# Patient Record
Sex: MALE | Race: WHITE | NOT HISPANIC OR LATINO | Employment: FULL TIME | URBAN - METROPOLITAN AREA
[De-identification: names, ages, dates, MRNs, and addresses within clinical notes are randomized per-mention and may not be internally consistent; named-entity substitution may affect disease eponyms.]

---

## 2021-08-28 ENCOUNTER — HOSPITAL ENCOUNTER (EMERGENCY)
Facility: MEDICAL CENTER | Age: 46
End: 2021-08-28
Attending: EMERGENCY MEDICINE
Payer: COMMERCIAL

## 2021-08-28 ENCOUNTER — APPOINTMENT (OUTPATIENT)
Dept: RADIOLOGY | Facility: MEDICAL CENTER | Age: 46
End: 2021-08-28
Attending: EMERGENCY MEDICINE

## 2021-08-28 VITALS
BODY MASS INDEX: 22.21 KG/M2 | HEIGHT: 74 IN | OXYGEN SATURATION: 97 % | SYSTOLIC BLOOD PRESSURE: 107 MMHG | RESPIRATION RATE: 15 BRPM | TEMPERATURE: 98.2 F | HEART RATE: 61 BPM | DIASTOLIC BLOOD PRESSURE: 65 MMHG | WEIGHT: 173.06 LBS

## 2021-08-28 DIAGNOSIS — R10.11 RUQ ABDOMINAL PAIN: ICD-10-CM

## 2021-08-28 LAB
ALBUMIN SERPL BCP-MCNC: 4.3 G/DL (ref 3.2–4.9)
ALBUMIN/GLOB SERPL: 1.8 G/DL
ALP SERPL-CCNC: 98 U/L (ref 30–99)
ALT SERPL-CCNC: 22 U/L (ref 2–50)
ANION GAP SERPL CALC-SCNC: 13 MMOL/L (ref 7–16)
APPEARANCE UR: CLEAR
AST SERPL-CCNC: 22 U/L (ref 12–45)
BASOPHILS # BLD AUTO: 1 % (ref 0–1.8)
BASOPHILS # BLD: 0.06 K/UL (ref 0–0.12)
BILIRUB SERPL-MCNC: 0.6 MG/DL (ref 0.1–1.5)
BILIRUB UR QL STRIP.AUTO: NEGATIVE
BUN SERPL-MCNC: 18 MG/DL (ref 8–22)
CALCIUM SERPL-MCNC: 8.9 MG/DL (ref 8.5–10.5)
CHLORIDE SERPL-SCNC: 103 MMOL/L (ref 96–112)
CO2 SERPL-SCNC: 23 MMOL/L (ref 20–33)
COLOR UR: YELLOW
CREAT SERPL-MCNC: 1.26 MG/DL (ref 0.5–1.4)
EOSINOPHIL # BLD AUTO: 0.29 K/UL (ref 0–0.51)
EOSINOPHIL NFR BLD: 4.9 % (ref 0–6.9)
ERYTHROCYTE [DISTWIDTH] IN BLOOD BY AUTOMATED COUNT: 38.6 FL (ref 35.9–50)
GLOBULIN SER CALC-MCNC: 2.4 G/DL (ref 1.9–3.5)
GLUCOSE SERPL-MCNC: 104 MG/DL (ref 65–99)
GLUCOSE UR STRIP.AUTO-MCNC: NEGATIVE MG/DL
HCT VFR BLD AUTO: 46.9 % (ref 42–52)
HGB BLD-MCNC: 16.5 G/DL (ref 14–18)
IMM GRANULOCYTES # BLD AUTO: 0.01 K/UL (ref 0–0.11)
IMM GRANULOCYTES NFR BLD AUTO: 0.2 % (ref 0–0.9)
KETONES UR STRIP.AUTO-MCNC: NEGATIVE MG/DL
LEUKOCYTE ESTERASE UR QL STRIP.AUTO: NEGATIVE
LIPASE SERPL-CCNC: 11 U/L (ref 11–82)
LYMPHOCYTES # BLD AUTO: 1.64 K/UL (ref 1–4.8)
LYMPHOCYTES NFR BLD: 27.6 % (ref 22–41)
MCH RBC QN AUTO: 30.7 PG (ref 27–33)
MCHC RBC AUTO-ENTMCNC: 35.2 G/DL (ref 33.7–35.3)
MCV RBC AUTO: 87.3 FL (ref 81.4–97.8)
MICRO URNS: NORMAL
MONOCYTES # BLD AUTO: 0.39 K/UL (ref 0–0.85)
MONOCYTES NFR BLD AUTO: 6.6 % (ref 0–13.4)
NEUTROPHILS # BLD AUTO: 3.55 K/UL (ref 1.82–7.42)
NEUTROPHILS NFR BLD: 59.7 % (ref 44–72)
NITRITE UR QL STRIP.AUTO: NEGATIVE
NRBC # BLD AUTO: 0 K/UL
NRBC BLD-RTO: 0 /100 WBC
PH UR STRIP.AUTO: 5 [PH] (ref 5–8)
PLATELET # BLD AUTO: 193 K/UL (ref 164–446)
PMV BLD AUTO: 10.9 FL (ref 9–12.9)
POTASSIUM SERPL-SCNC: 3.9 MMOL/L (ref 3.6–5.5)
PROT SERPL-MCNC: 6.7 G/DL (ref 6–8.2)
PROT UR QL STRIP: NEGATIVE MG/DL
RBC # BLD AUTO: 5.37 M/UL (ref 4.7–6.1)
RBC UR QL AUTO: NEGATIVE
SODIUM SERPL-SCNC: 139 MMOL/L (ref 135–145)
SP GR UR STRIP.AUTO: 1.01
UROBILINOGEN UR STRIP.AUTO-MCNC: 0.2 MG/DL
WBC # BLD AUTO: 5.9 K/UL (ref 4.8–10.8)

## 2021-08-28 PROCEDURE — 80053 COMPREHEN METABOLIC PANEL: CPT

## 2021-08-28 PROCEDURE — 85025 COMPLETE CBC W/AUTO DIFF WBC: CPT

## 2021-08-28 PROCEDURE — 81003 URINALYSIS AUTO W/O SCOPE: CPT

## 2021-08-28 PROCEDURE — 99284 EMERGENCY DEPT VISIT MOD MDM: CPT

## 2021-08-28 PROCEDURE — 76705 ECHO EXAM OF ABDOMEN: CPT

## 2021-08-28 PROCEDURE — 83690 ASSAY OF LIPASE: CPT

## 2021-08-28 NOTE — ED NOTES
Pt ambulated from triage to room without difficulty. UA obtained, sent to lab. Pt in gown, on monitor. Chart up for ERP. Call light in reach.

## 2021-08-28 NOTE — ED TRIAGE NOTES
"Chief Complaint   Patient presents with   • RUQ Pain     pt states intermittent RUQ painx2 weeks. pt states pain is worse after he eats and worse at night. pt states pain radiates to R shoulder blade       Pt walk in for above. Pt denies any n/v. Protocol ordered. Pt states pain is 3/10 at the moment. Educated pt on triage process and to notify if there is any change.    /69   Pulse 72   Temp 36.7 °C (98.1 °F) (Temporal)   Resp 16   Ht 1.88 m (6' 2\")   Wt 78.5 kg (173 lb 1 oz)   SpO2 96%   BMI 22.22 kg/m²     "

## 2021-08-28 NOTE — ED PROVIDER NOTES
ED Provider Note    Scribed for Gerardo Abbasi M.D. by Parviz Rojo. 8/28/2021  4:13 PM    Primary care provider: Pcp Pt States None  Means of arrival: Walk-in  History obtained from: Patient  History limited by: None    CHIEF COMPLAINT  Chief Complaint   Patient presents with   • RUQ Pain     pt states intermittent RUQ painx2 weeks. pt states pain is worse after he eats and worse at night. pt states pain radiates to R shoulder blade       HPI  Daquan Tabor is a 45 y.o. male who presents to the Emergency Department for evaluation of acute right upper quadrant pain onset about two weeks ago. The pain is intermittent, and is worse in the evenings or after eating. The pain is triggered by any type of food, but is worse if he eats a large amount. The pain lasts several hours before resolving. He has had chills, one episode of nausea, and diarrhea. He has several episodes of diarrhea daily. No vomiting, fevers, cough, chest pain, shortness of breath, or dysuria. He does not drink daily, use Ibuprofen or Asprin excessively, or have any other medical problems. He is vaccinated against COVID-19.    REVIEW OF SYSTEMS  Pertinent positives include: right upper quadrant pain, nausea, chills, and diarrhea.  Pertinent negatives include: no vomiting, fevers, cough, chest pain, shortness of breath, or dysuria.  10+ systems reviewed and negative.      PAST MEDICAL HISTORY  Denies      SOCIAL HISTORY  Social History     Tobacco Use   • Smoking status: Current Some Day Smoker   • Smokeless tobacco: Never Used   Substance Use Topics   • Alcohol use: Yes   • Drug use: Not noted     Social History     Substance and Sexual Activity   Drug Use Not noted       SURGICAL HISTORY  No abdominal surgeries    CURRENT MEDICATIONS  Home Medications     Reviewed by Marlyn Howell R.N. (Registered Nurse) on 08/28/21 at 1316  Med List Status: Complete   Medication Last Dose Status        Patient Archie Taking any Medications                  "      ALLERGIES  No Known Allergies    PHYSICAL EXAM  VITAL SIGNS: BP (!) 94/64   Pulse 63   Temp 36.7 °C (98.1 °F) (Temporal)   Resp 14   Ht 1.88 m (6' 2\")   Wt 78.5 kg (173 lb 1 oz)   SpO2 94%   BMI 22.22 kg/m²   Reviewed and low normal blood pressure, afebrile, well-appearing  Constitutional: Well developed, Well nourished, No acute distress.  HENT: Normocephalic, atraumatic, bilateral external ears normal, wearing a mask.   Eyes: PERRLA, conjunctiva pink, no scleral icterus.   Cardiovascular: Regular rate and rhythm. No murmurs, rubs or gallops.  No dependent edema or calf tenderness  Respiratory: Lungs clear to auscultation bilaterally. No wheezes, rales, or rhonchi.  Abdominal:  Abdomen soft, non-tender, non distended. No rebound, or guarding.    Skin: No erythema, no rash.   Genitourinary: No costovertebral angle tenderness.   Musculoskeletal: no deformities.   Neurologic: Alert & oriented x 3, cranial nerves 2-12 intact by passive exam.  No focal deficit noted.  Psychiatric: Affect normal, Judgment normal, Mood normal.     DIFFERENTIAL DIAGNOSIS:  gall stones, cholecystitis, pancreatitis, gastritis, ulcer.    RADIOLOGY/PROCEDURES  US-RUQ   Final Result      1.  No acute sonographic abnormality. No gallstones.   2.  Increased right renal cortical echogenicity, suggestive of medical renal disease.           Radiologist interpretation have been reviewed by me.     LABORATORY:  Results for orders placed or performed during the hospital encounter of 08/28/21   CBC WITH DIFFERENTIAL   Result Value Ref Range    WBC 5.9 4.8 - 10.8 K/uL    RBC 5.37 4.70 - 6.10 M/uL    Hemoglobin 16.5 14.0 - 18.0 g/dL    Hematocrit 46.9 42.0 - 52.0 %    MCV 87.3 81.4 - 97.8 fL    MCH 30.7 27.0 - 33.0 pg    MCHC 35.2 33.7 - 35.3 g/dL    RDW 38.6 35.9 - 50.0 fL    Platelet Count 193 164 - 446 K/uL    MPV 10.9 9.0 - 12.9 fL    Neutrophils-Polys 59.70 44.00 - 72.00 %    Lymphocytes 27.60 22.00 - 41.00 %    Monocytes 6.60 0.00 - " 13.40 %    Eosinophils 4.90 0.00 - 6.90 %    Basophils 1.00 0.00 - 1.80 %    Immature Granulocytes 0.20 0.00 - 0.90 %    Nucleated RBC 0.00 /100 WBC    Neutrophils (Absolute) 3.55 1.82 - 7.42 K/uL    Lymphs (Absolute) 1.64 1.00 - 4.80 K/uL    Monos (Absolute) 0.39 0.00 - 0.85 K/uL    Eos (Absolute) 0.29 0.00 - 0.51 K/uL    Baso (Absolute) 0.06 0.00 - 0.12 K/uL    Immature Granulocytes (abs) 0.01 0.00 - 0.11 K/uL    NRBC (Absolute) 0.00 K/uL   COMP METABOLIC PANEL   Result Value Ref Range    Sodium 139 135 - 145 mmol/L    Potassium 3.9 3.6 - 5.5 mmol/L    Chloride 103 96 - 112 mmol/L    Co2 23 20 - 33 mmol/L    Anion Gap 13.0 7.0 - 16.0    Glucose 104 (H) 65 - 99 mg/dL    Bun 18 8 - 22 mg/dL    Creatinine 1.26 0.50 - 1.40 mg/dL    Calcium 8.9 8.5 - 10.5 mg/dL    AST(SGOT) 22 12 - 45 U/L    ALT(SGPT) 22 2 - 50 U/L    Alkaline Phosphatase 98 30 - 99 U/L    Total Bilirubin 0.6 0.1 - 1.5 mg/dL    Albumin 4.3 3.2 - 4.9 g/dL    Total Protein 6.7 6.0 - 8.2 g/dL    Globulin 2.4 1.9 - 3.5 g/dL    A-G Ratio 1.8 g/dL   LIPASE   Result Value Ref Range    Lipase 11 11 - 82 U/L   URINALYSIS    Specimen: Urine, Clean Catch   Result Value Ref Range    Color Yellow     Character Clear     Specific Gravity 1.014 <1.035    Ph 5.0 5.0 - 8.0    Glucose Negative Negative mg/dL    Ketones Negative Negative mg/dL    Protein Negative Negative mg/dL    Bilirubin Negative Negative    Urobilinogen, Urine 0.2 Negative    Nitrite Negative Negative    Leukocyte Esterase Negative Negative    Occult Blood Negative Negative    Micro Urine Req see below      Lab results reviewed by me.       ED COURSE:  Nursing notes, [facility] reviewed in chart.     4:13 PM - Patient seen and examined at bedside. He declined the need for pain medication at this time. Ordered US-RUQ, CBC with differential, CMP, Lipase, and UA to evaluate.     6:20 PM - Patient was reevaluated at bedside. Discussed lab and radiology results with the patient and informed them that they  are reassuring. Return precautions were discussed with the patient, and they were cleared for discharge at this time. Patient was understanding and agreeable to discharge.     MEDICAL DECISION MAKING:  Well-appearing patient presents with episodes of abdominal pain after eating over the last 2 weeks.  He is pain-free now and has no tenderness.  There is no fever.  Labs are reassuringly normal.  Ultrasound is negative for cholelithiasis or cholecystitis.  His pain is likely dyspeptic representing gastritis or peptic ulcer disease.  It did start after 3 days of drinking alcohol.  He does not use NSAIDs.  There is no evidence of perforation.    PLAN:  Omeprazole  Abstain from alcohol and NSAIDs 4 weeks  Gastritis handout given  Return for worsening pain, bleeding, or pain and fever    Aloe up as below if still having symptoms 3 to 4 days  [facility]  [address] [place] [id number] [phone]          CONDITION: good.     FINAL IMPRESSION  1. RUQ abdominal pain          Parviz LIZARRAGA (Scribe), am scribing for, and in the presence of, Gerardo Abbasi M.D..    Electronically signed by: Parviz Rojo (Scribe), 8/28/2021    Gerardo LIZARRAGA M.D. personally performed the services described in this documentation, as scribed by Parviz Rojo in my presence, and it is both accurate and complete.    The note accurately reflects work and decisions made by me.  Gerardo Abbasi M.D.  8/28/2021  7:55 PM

## 2021-08-29 NOTE — ED NOTES
Pt given d/c instructions, including follow up care with [facility]. Questions addressed. Pt oriented and stable upon d/c. Ambulated out of ED without difficulty.

## 2021-08-29 NOTE — DISCHARGE INSTRUCTIONS
Gastritis    Avoid all ibuprofen and naproxen, aspirin and alcohol.  Take Prilosec 40 mg a day for 4 weeks.  Return for worsening pain, bleeding or pain and fever.  Followup with your doctor or GI if not better in 3-4 days.   Take maalox and Tylenol if needed until acid blocker takes effect.    You had a borderline or high normal blood pressure reading today.  This does not necessarily mean you have hypertension.  Please followup with your/a primary physician for comprehensive blood pressure evaluation and yearly fasting cholesterol assessment.  BP Readings from Last 3 Encounters:   08/28/21 (!) 94/64   08/20/14 118/68   08/05/14 126/82         You have gastritis. Gastritis is an irritation of the stomach. This is often caused by medications, but can be from anything that bothers the stomach.   Other stomach irritants are:  Alcohol.  Caffeine.  Nicotine.  Spicy or acid foods.     Medications for pain and arthritis. Aspirin and other anti-inflammatory medicines such as ibuprofen (Advil), naproxen (Aleve), and ketoprofen (Orudis) can be highly irritating.  Emotional distress.     Symptoms of gastritis may include:  Abdominal pain. Indigestion. Nausea and or vomiting. Bleeding.      Some patients with chronic gastritis and ulcers have been infected by a germ. They may need special testing. Medications which kill germs can be used to cure this condition.    Treatment includes avoiding the substances mentioned above that are known to cause stomach trouble.    Medications used to treat gastritis can include:  Antacids.  Medicines to control vomiting.   Acid blocking medicines (Axid, Pepcid, Prevacid, Prilosec, Tagamet, Zantac).     Symptoms of gastritis usually improve within 2-3 days of starting treatment. Call your caregiver if you are not better in a few days.    Seek medical care if you:    Have increased stomach pain or chest pain.  Vomit blood.  Faint or feel light headed. Can not keep fluids down.  Pass bloody or  black stools.  Develop severe back pain.     Document Released: 12/18/2006  Document Re-Released: 06/30/2008  [employer]® Patient Information ©2008 [employer], [employer]

## 2024-05-21 ENCOUNTER — APPOINTMENT (OUTPATIENT)
Dept: RADIOLOGY | Facility: IMAGING CENTER | Age: 49
End: 2024-05-21
Attending: NURSE PRACTITIONER

## 2024-05-21 ENCOUNTER — OFFICE VISIT (OUTPATIENT)
Dept: URGENT CARE | Facility: CLINIC | Age: 49
End: 2024-05-21

## 2024-05-21 VITALS
DIASTOLIC BLOOD PRESSURE: 64 MMHG | BODY MASS INDEX: 23.1 KG/M2 | HEART RATE: 78 BPM | TEMPERATURE: 97.9 F | RESPIRATION RATE: 12 BRPM | SYSTOLIC BLOOD PRESSURE: 128 MMHG | HEIGHT: 74 IN | WEIGHT: 180 LBS | OXYGEN SATURATION: 96 %

## 2024-05-21 DIAGNOSIS — S99.912A INJURY OF LEFT ANKLE, INITIAL ENCOUNTER: ICD-10-CM

## 2024-05-21 DIAGNOSIS — S93.402A SPRAIN OF LEFT ANKLE, UNSPECIFIED LIGAMENT, INITIAL ENCOUNTER: ICD-10-CM

## 2024-05-21 PROBLEM — F32.A DEPRESSION: Status: ACTIVE | Noted: 2021-01-11

## 2024-05-21 PROBLEM — R19.7 DIARRHEA: Status: ACTIVE | Noted: 2021-01-11

## 2024-05-21 PROBLEM — R12 HEARTBURN: Status: ACTIVE | Noted: 2021-01-11

## 2024-05-21 PROCEDURE — 99203 OFFICE O/P NEW LOW 30 MIN: CPT | Performed by: NURSE PRACTITIONER

## 2024-05-21 PROCEDURE — 3078F DIAST BP <80 MM HG: CPT | Performed by: NURSE PRACTITIONER

## 2024-05-21 PROCEDURE — 3074F SYST BP LT 130 MM HG: CPT | Performed by: NURSE PRACTITIONER

## 2024-05-21 PROCEDURE — 73610 X-RAY EXAM OF ANKLE: CPT | Mod: TC,LT | Performed by: RADIOLOGY

## 2024-05-21 RX ORDER — SILDENAFIL CITRATE 20 MG/1
40 TABLET ORAL
COMMUNITY

## 2024-05-21 NOTE — PROGRESS NOTES
Chief Complaint   Patient presents with    Foot Injury     Injured foot thinks that might have stepped wrong can't really walk or put weight on left foot this incident happened last Monday        HISTORY OF PRESENT ILLNESS: Patient is a pleasant 48 y.o. male who presents to urgent care today with complaints of left ankle pain.  The patient states he has had pain for the past week.  He was walking his dog, on uneven ground but does not remember any significant incident causing his pain.  He noticed some pain to medial ankle the following day, has had pain and swelling since.  Denies previous injuries to this ankle.  Has tried crutches and ibuprofen for symptom relief.    Patient Active Problem List    Diagnosis Date Noted    Depression 01/11/2021    Diarrhea 01/11/2021    Heartburn 01/11/2021       Allergies:Patient has no known allergies.    Current Outpatient Medications Ordered in Epic   Medication Sig Dispense Refill    sildenafil (REVATIO) 20 MG tablet Take 40 mg by mouth.       No current Epic-ordered facility-administered medications on file.       History reviewed. No pertinent past medical history.    Social History     Tobacco Use    Smoking status: Some Days    Smokeless tobacco: Never   Substance Use Topics    Alcohol use: Yes       No family status information on file.   History reviewed. No pertinent family history.    ROS:  Review of Systems   Constitutional: Negative for fever, chills, weight loss, malaise, and fatigue.   HENT: Negative for ear pain, nosebleeds, congestion, sore throat and neck pain.    Eyes: Negative for vision changes.   Neuro: Negative for headache, sensory changes, weakness, seizure, LOC.   Cardiovascular: Negative for chest pain, palpitations, orthopnea and leg swelling.   Respiratory: Negative for cough, sputum production, shortness of breath and wheezing.   Gastrointestinal: Negative for abdominal pain, nausea, vomiting or diarrhea.   Genitourinary: Negative for dysuria,  "urgency and frequency.  Musculoskeletal: Positive for left ankle pain.  Negative for falls, neck pain, back pain, myalgias.   Skin: Negative for rash, diaphoresis.     Exam:  /64 (BP Location: Left arm, Patient Position: Sitting)   Pulse 78   Temp 36.6 °C (97.9 °F) (Temporal)   Resp 12   Ht 1.88 m (6' 2\")   Wt 81.6 kg (180 lb)   SpO2 96%   General: well-nourished, well-developed male in NAD  Head: normocephalic, atraumatic  Eyes: PERRLA, no conjunctival injection, acuity grossly intact, lids normal.  Ears: normal shape and symmetry, no tenderness, no discharge. External canals are without any significant edema or erythema. Tympanic membranes are without any inflammation, no effusion. Gross auditory acuity is intact.  Nose: symmetrical without tenderness, no discharge.  Mouth/Throat: reasonable hygiene, no erythema, exudates or tonsillar enlargement.  Neck: no masses, range of motion within normal limits, no tracheal deviation. No obvious thyroid enlargement.   Lymph: no cervical adenopathy. No supraclavicular adenopathy.   Neuro: alert and oriented. Cranial nerves 1-12 grossly intact. No sensory deficit.   Cardiovascular: regular rate and rhythm. No edema.  Pulmonary: no distress. Chest is symmetrical with respiration, no wheezes, crackles, or rhonchi.   Musculoskeletal: no clubbing, appropriate muscle tone, gait is antalgic.  Left ankle: Minimal soft tissue tenderness posterior medial malleolus.  Pain with range of motion.  Left foot is normal appearance, nontender, distal neurovascular intact, cap refill is brisk.  Skin: warm, dry, intact, no clubbing, no cyanosis, no rashes.   Psych: appropriate mood, affect, judgement.       DX left ankle radiology reading \"No displaced left ankle fracture. Potential nondisplaced inferior medial malleolar fracture seen on one view only\"      Assessment/Plan:  1. Injury of left ankle, initial encounter  DX-ANKLE 3+ VIEWS LEFT    Referral to Orthopedics      2. Sprain " of left ankle, unspecified ligament, initial encounter  Referral to Orthopedics            The patient is a pleasant 48-year-old who presents with left ankle injury.  X-ray is abnormal with concern of possible medial malleolus fracture.  Patient has a tall cam walking boot at home, preferring to use his home boot.  Instructed to be nonweightbearing, using crutches.  RICE.  OTC NSAIDs.  Referral to orthopedics for urgent follow-up.  Supportive care, differential diagnoses, and indications for immediate follow-up discussed with patient.   Pathogenesis of diagnosis discussed including typical length and natural progression.   Instructed to return to clinic or nearest emergency department for any change in condition, further concerns, or worsening of symptoms.  Patient states understanding of the plan of care and discharge instructions.        Please note that this dictation was created using voice recognition software. I have made every reasonable attempt to correct obvious errors, but I expect that there are errors of grammar and possibly content that I did not discover before finalizing the note.      CURT Waters.